# Patient Record
Sex: FEMALE | Race: WHITE
[De-identification: names, ages, dates, MRNs, and addresses within clinical notes are randomized per-mention and may not be internally consistent; named-entity substitution may affect disease eponyms.]

---

## 2022-10-19 ENCOUNTER — HOSPITAL ENCOUNTER (EMERGENCY)
Dept: HOSPITAL 4 - SED | Age: 42
Discharge: HOME | End: 2022-10-19
Payer: COMMERCIAL

## 2022-10-19 VITALS — WEIGHT: 138 LBS | BODY MASS INDEX: 22.99 KG/M2 | HEIGHT: 65 IN

## 2022-10-19 VITALS — SYSTOLIC BLOOD PRESSURE: 135 MMHG

## 2022-10-19 VITALS — SYSTOLIC BLOOD PRESSURE: 134 MMHG

## 2022-10-19 DIAGNOSIS — S60.211A: Primary | ICD-10-CM
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
## 2022-10-19 DIAGNOSIS — S70.01XA: ICD-10-CM
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
## 2022-10-19 DIAGNOSIS — Z79.899: ICD-10-CM

## 2022-10-19 PROCEDURE — 71045 X-RAY EXAM CHEST 1 VIEW: CPT

## 2022-10-19 PROCEDURE — 73502 X-RAY EXAM HIP UNI 2-3 VIEWS: CPT

## 2022-10-19 PROCEDURE — 73100 X-RAY EXAM OF WRIST: CPT

## 2022-10-19 PROCEDURE — 96372 THER/PROPH/DIAG INJ SC/IM: CPT

## 2022-10-19 PROCEDURE — 81025 URINE PREGNANCY TEST: CPT

## 2022-10-19 PROCEDURE — 99284 EMERGENCY DEPT VISIT MOD MDM: CPT

## 2022-10-19 NOTE — NUR
Initial discharge paperwork was discussed with patient. Patient and patient's 
 explained, "Why are we only given naproxen for pain?" Patient explained 
that she would like a muscle relaxer, but did not want to go to her primary 
care doctor again to request the medication. Dr. Tejada made aware of patient's 
concern and both Dr. Tejada and CRISTINE Knutson went to patient's bedside to listen to 
patient and patient's  concerns.

## 2022-10-19 NOTE — NUR
Dr. Tejada at bedside. Morphine IV offered for pain. Patient declined. Patient's 
, who explained he was a "Captain from [facility]," 
discussed the possibility of fentanyl IV for patient. Patient declined. Dr. Tejada offered smaller dose of morphine for patient. Patient declined 
medication. Toradol medication was discussed and patient agreed to trying the 
medication.

## 2022-10-19 NOTE — NUR
Patient given written and verbal discharge instructions and verbalizes 
understanding.  ER MD discussed with patient the results and treatment 
provided. Patient in stable condition. ID arm band removed. IV catheter removed 
intact and dressing applied, no active bleeding.

Rx of naprosyn given. Patient educated on pain management and to follow up with 
PMD. Pain Scale .

Opportunity for questions provided and answered. Medication side effect fact 
sheet provided.
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
## 2022-10-19 NOTE — NUR
Patient requesting muscle relaxer prior to discharge. Dr. Tejada informed and 
spoke with patient. Patient appeared upset regarding having to follow primary 
care provider for follow up regarding medications. Dr. Tejada and RN Eamon at 
bedside to listen to patient and patient's  concerns. New discharge 
paperwork printed and given to patient by resource RN. Patient upset that 
wheelchair was not provided upon discharge. Wheelchair given and patient 
transported out of hospital. Will continue to monitor.